# Patient Record
Sex: FEMALE
[De-identification: names, ages, dates, MRNs, and addresses within clinical notes are randomized per-mention and may not be internally consistent; named-entity substitution may affect disease eponyms.]

---

## 2017-08-11 ENCOUNTER — HOSPITAL ENCOUNTER (EMERGENCY)
Dept: HOSPITAL 14 - H.ER | Age: 32
Discharge: HOME | End: 2017-08-11
Payer: SELF-PAY

## 2017-08-11 VITALS
DIASTOLIC BLOOD PRESSURE: 83 MMHG | RESPIRATION RATE: 18 BRPM | OXYGEN SATURATION: 100 % | SYSTOLIC BLOOD PRESSURE: 131 MMHG | TEMPERATURE: 99 F | HEART RATE: 89 BPM

## 2017-08-11 DIAGNOSIS — Z3A.01: ICD-10-CM

## 2017-08-11 DIAGNOSIS — O20.0: Primary | ICD-10-CM

## 2017-08-11 LAB
BASOPHILS # BLD AUTO: 0.1 K/UL (ref 0–0.2)
BASOPHILS NFR BLD: 0.8 % (ref 0–2)
BUN SERPL-MCNC: 9 MG/DL (ref 7–17)
CALCIUM SERPL-MCNC: 9.5 MG/DL (ref 8.4–10.2)
EOSINOPHIL # BLD AUTO: 0.1 K/UL (ref 0–0.7)
EOSINOPHIL NFR BLD: 0.7 % (ref 0–4)
ERYTHROCYTE [DISTWIDTH] IN BLOOD BY AUTOMATED COUNT: 13.6 % (ref 11.5–14.5)
GFR NON-AFRICAN AMERICAN: > 60
HGB BLD-MCNC: 11.5 G/DL (ref 12–16)
LYMPHOCYTES # BLD AUTO: 3 K/UL (ref 1–4.3)
LYMPHOCYTES NFR BLD AUTO: 34.8 % (ref 20–40)
MCH RBC QN AUTO: 30.9 PG (ref 27–31)
MCHC RBC AUTO-ENTMCNC: 33.1 G/DL (ref 33–37)
MCV RBC AUTO: 93.2 FL (ref 81–99)
MONOCYTES # BLD: 1 K/UL (ref 0–0.8)
MONOCYTES NFR BLD: 11.3 % (ref 0–10)
NEUTROPHILS # BLD: 4.5 K/UL (ref 1.8–7)
NEUTROPHILS NFR BLD AUTO: 52.4 % (ref 50–75)
NRBC BLD AUTO-RTO: 0 % (ref 0–0)
PLATELET # BLD: 231 K/UL (ref 130–400)
PMV BLD AUTO: 9.6 FL (ref 7.2–11.7)
RBC # BLD AUTO: 3.72 MIL/UL (ref 3.8–5.2)
WBC # BLD AUTO: 8.6 K/UL (ref 4.8–10.8)

## 2017-08-11 NOTE — US
PROCEDURE:  First trimester fetal ultrasound



HISTORY:

pain of pregnancy



COMPARISON:

None



TECHNIQUE:

Transvaginal only. Real -time technique with 2D, duplex and color 

Doppler



FINDINGS:

Uterus measures 3.7 x 4.6 x 8.6 cm.



Gestational sac mean 1.24 cm corresponds to gestational age of 5 

weeks 3 days.  Yolk sac identified. No fetal pole identified.



Gestational age based On LMP 06/30/2017 6 weeks 0 days. 



Closed cervix 3.7 cm.



Right ovary 1.6 x 2.2 cm. Doppler arterial waveform documented.



Left ovary: 2 x 2 x 2.5 cm.  Unremarkable Doppler arterial waveform 

documented.







IMPRESSION:

Early intrauterine gestation.



PANCHITO based on LMP: 04/06/2018



PANCHITO based on biometry: 04/11/2018.

## 2017-08-11 NOTE — ED PDOC
HPI: Female  Pain


Chief Complaint (Provider): Vaginal bleeding


History Per: Patient


History/Exam Limitations: no limitations


Onset/Duration Of Symptoms: Hrs


Current Symptoms Are (Timing): Still Present


Pain Scale Rating Of: 3


Quality Of Discomfort: Cramping (suprapubic)


Associated Symptoms: denies: Fever, Nausea, Vomiting, Diarrhea, Urinary Symptoms


Additional Complaint(s): 





30 y/o F with no PMHx presents to ED c/o spotting vaginal bleeding since this 

morning. Patient is 6 weeks pregnant. G1. LMP 17. She was in the hosp 3 

days ago and betaHCG was >88424. PMD Dr Ribera. Denies trauma, vomiting, nausea

, diarrhea, headaches, dizziness, palpitations, SOB or CP. C/o mild pelvic 

cramps for the past 2 weeks. 


Abnormal Vaginal Bleeding: Yes


Last Menstral Period: 17


: 1


Para: 0





<Jethro Burgos - Last Filed: 17 16:39>





<Torito Mays - Last Filed: 17 18:40>


Chief Complaint (Nursing): Female Genitourinary





Supervising Attending Note





- Supervising Attending Note


The Documented history was done by the: Physician Extender


The documented physical exam was done by the: Physician Extender


The documented procedures were done by the: Physician Extender





- Attestation:


I have personally seen and examined this patient.: Yes


I have fully participated in the care of the patient.: Yes


I have reviewed all pertinent clinical information, including history, physical 

exam and plan: Yes





- Notes:


Notes:: 





Vaginal bleeding


Preg.





<Torito Mays - Last Filed: 17 18:40>





Past Medical History


Reviewed: Nursing Documentation, Vital Signs


Vital Signs: 





 Last Vital Signs











Temp  99 F   17 14:23


 


Pulse  89   17 14:23


 


Resp  18   17 14:23


 


BP  131/83   17 14:23


 


Pulse Ox  100   17 14:23














- Medical History


PMH: No Chronic Diseases





- Surgical History


Other surgeries: Breast augmentation





- Family History


Family History: States: No Known Family Hx





- Social History


Current smoker - smoking cessation education provided: No


Alcohol: None





<Jethro Burgos - Last Filed: 17 16:39>


Vital Signs: 





 Last Vital Signs











Temp  99 F   17 14:23


 


Pulse  89   17 14:23


 


Resp  18   17 14:23


 


BP  131/83   17 14:23


 


Pulse Ox  100   17 16:41














<Torito Mays - Last Filed: 17 18:40>





- Allergies


Allergies/Adverse Reactions: 


 Allergies











Allergy/AdvReac Type Severity Reaction Status Date / Time


 


No Known Allergies Allergy   Verified 17 14:23














Review of Systems


ROS Statement: Except As Marked, All Systems Reviewed And Found Negative


Genitourinary Female: Positive for: Vaginal Bleeding (spotting), Pelvic Pain (

cramping)





<Jethro Burgos - Last Filed: 17 16:39>





Physical Exam





- Reviewed


Vital Signs Reviewed: Yes





- Physical Exam


Appears: Positive for: Non-toxic, No Acute Distress


Head Exam: Positive for: ATRAUMATIC, NORMAL INSPECTION


Skin: Positive for: Normal Color, Warm


Cardiovascular/Chest: Positive for: Regular Rate, Rhythm.  Negative for: Gallop

, Murmur


Respiratory: Positive for: Normal Breath Sounds.  Negative for: Crackles, 

Wheezing


Gastrointestinal/Abdominal: Positive for: Soft.  Negative for: Tenderness, 

Distended, Guarding, Rebound


Back: Negative for: L CVA Tenderness, R CVA Tenderness


Extremity: Positive for: Normal ROM.  Negative for: Tenderness, Pedal Edema


Neurologic/Psych: Positive for: Alert, Oriented.  Negative for: Motor/Sensory 

Deficits





<Jethro Burgos - Last Filed: 17 16:39>





- Physical Exam


Cardiovascular/Chest: Positive for: Regular Rate, Rhythm


Respiratory: Positive for: Normal Breath Sounds


Gastrointestinal/Abdominal: Positive for: Soft.  Negative for: Tenderness





<Torito Mays - Last Filed: 17 18:40>





- ECG


O2 Sat by Pulse Oximetry: 100





<Jethro Burgos - Last Filed: 17 16:39>





- Laboratory Results


Result Diagrams: 


 17 16:55





 17 16:55


Interpretation Of Abn Labs: 24,225 bhcg





- Progress


ED Course And Treament: 





1839:  Stable.  AAOx3.  Pain free.  Tolerated PO.  Fu with pcp.





<Torito Mays - Last Filed: 17 18:40>





Medical Decision Making


Medical Decision Makin y/o F with no PMHx, G1, present c/o spotting vaginal bleeding





1st trimester vaginal bleeding


Rule out threatened  and ectopic pregnancy


TV and Obstetric US


BetaHCG


CMP, CBC, T&S





<Jethro Burgos - Last Filed: 17 16:39>





Disposition





<Jethro Burgos - Last Filed: 17 16:39>





- Patient ED Disposition


Is Patient to be Admitted: No


Counseled Patient/Family Regarding: Studies Performed, Diagnosis, Need For 

Followup





- Disposition


Disposition: Routine/Home


Disposition Time: 18:39





<Torito Mays - Last Filed: 17 18:40>





- Clinical Impression


Clinical Impression: 


 Threatened miscarriage








- Disposition


Condition: STABLE


Additional Instructions: 


Return if not in 3 days.  See the obgyn in 3 days.


Instructions:  Threatened Miscarriage (ED)


Forms:  [facility] (English)

## 2018-03-16 ENCOUNTER — HOSPITAL ENCOUNTER (EMERGENCY)
Dept: HOSPITAL 14 - H.EROB2 | Age: 33
Discharge: HOME | End: 2018-03-16
Payer: COMMERCIAL

## 2018-03-16 VITALS — BODY MASS INDEX: 28.4 KG/M2

## 2018-03-16 DIAGNOSIS — Z3A.36: ICD-10-CM

## 2018-03-16 DIAGNOSIS — O36.8130: Primary | ICD-10-CM

## 2018-03-16 NOTE — OBHP
===========================

Datetime: 2018 08:06

===========================

   

IP Adm Impression:  , intrauterine pregnancy

IP Admit Plan:  Observation/Evaluation; Discharge home

Admit Comment, IP Provider:  33 yo  at 36 wks w/ EDC 2018 by 6+ wk u/s reports that she 
has not been feeling the baby move throughout the night and this morning.  Pt reports that she usuall
y feels the babymoving a lot in the am.  Pt reports that she feels [place] this am.  Pt denies 
VB, LOF.  

   PMH: Healthy

   PSH: Breast augmentation 

   Meds: PNVs, iron supplementation 

   Fam hx: N/c

   Soc hx: Pt denies tobacco, alcohol, illicit drug use 

   POB hx: TAB x1 

                SAB x 1 

   Gyn hx: 11 x regular periods, denies STDs, positive HPV 2017 pap

   PE: AFVSS 

   Gen'l: pt appears comfortable in bed

   Heart: RRR

   Chest: Lungs CTA b/l

   Abd: soft, NT, gravid

   Ext: NT, no edema

   EFM: as above 

   [place]: as above 

   A/P: 33 yo  at 36 wks w/ decreased fetal movement throughout the night and this am

   NST reactive, BPP 8/8

   Pt reports that she feels the baby moving now

   Pt reports that she is now seeing Dr. Khan 2x/ week to follow fetal growth 

   Pt has an appointment w/ me on Tues., 2018  

Extremities - PN:  Normal

Abdomen - PN:  Normal

Back - PN:  Normal

Lungs - PN:  Normal

Heart - PN:  Normal

General - PN:  Normal

FHR - Baseline A Provider:  130's

Contraction Comments Provider:  irregular 

EGA AdmitDate IP:  36.0

Vital Signs Provider:  Reviewed; Within Normal Limits

IP Chief Complaint:  Decreased fetal movement

NICHD Variability Prov Fetus A:  Moderate 6-25bpm

NICHD Accel Fetus A IP Provider:  [id number]

NICHD Decel Fetus A IP Provider:  Variable

## 2018-03-16 NOTE — US
PROCEDURE:  OB Pelvic Ultrasound



HISTORY:

decreased fetal movement



LMP: Unknown



COMPARISON:

None available.



FINDINGS:



UTERUS:

Gestational sac: A single viable intrauterine gestation identified 

with cardiac activity measuring 144 beats per minute. Biophysical 

profile score is 8. 



Fetal breathing movements score 2. 



Fetal movements score 2. 



Fetal tones score 2. 



Amniotic fluid score 2. 







Fetal biometry not performed as requested with biophysical profile 

performed as above. 



OTHER FINDINGS:

None. 



IMPRESSION:

Biophysical profile score total is 8. Please see details above.

## 2018-03-16 NOTE — OBDCSUM
===========================

Datetime: 03/16/2018 10:10

===========================

   

Discharged to, Provider:  Home

Follow up at, Provider:  Dr Ribera

Disch Instr Activity:  Normal activity

Disch Instr Diet:  Regular

Discharge Instructions, Provider:  Routine instructions given

Discharge Time:  03/16/2018 10:10

Follow up in weeks, Provider:  Next Scheduled Appointment

Disch Referrals:  None

Discharge Diagnosis Prov Other:  Decreased fetal movement at 36 weeks

## 2018-03-17 VITALS — SYSTOLIC BLOOD PRESSURE: 134 MMHG | DIASTOLIC BLOOD PRESSURE: 76 MMHG | HEART RATE: 86 BPM

## 2018-03-20 ENCOUNTER — HOSPITAL ENCOUNTER (EMERGENCY)
Dept: HOSPITAL 14 - H.EROB2 | Age: 33
Discharge: HOME | End: 2018-03-20
Payer: COMMERCIAL

## 2018-03-20 VITALS
RESPIRATION RATE: 18 BRPM | SYSTOLIC BLOOD PRESSURE: 120 MMHG | OXYGEN SATURATION: 100 % | TEMPERATURE: 98.1 F | DIASTOLIC BLOOD PRESSURE: 82 MMHG | HEART RATE: 89 BPM

## 2018-03-20 VITALS — BODY MASS INDEX: 28.4 KG/M2

## 2018-03-20 DIAGNOSIS — Z3A.36: ICD-10-CM

## 2018-03-20 DIAGNOSIS — O36.5930: ICD-10-CM

## 2018-03-20 NOTE — OBHP
===========================

Datetime: 2018 17:18

===========================

   

IP Adm Impression:  , intrauterine pregnancy; No Active Labor; Intact Membranes

IP Admit Plan:  Discharge home

Admit Comment, IP Provider:   IUP at 36w sent from PMD to check FH.  Doppler at the office ?80
-110's.  She feels baby moving  She odette no CTX; no VB; 

      

   PNC: CP  Dr Ribera - followed for SGA with biweekly NST

      

   PMH: denies

   PSH: denies

   NKA

   PSoH Denies smoking ETOH drugs

   POBGYNH: TOP x 1; spont ab x 1

      

   She declined  SVE

    

   A: IUP at 36w

   reactive NST

   PLAN: discahrge home and follow up as scheduled next wk.

      

      

      

Fetal Presentation-Admit:  Vertex

FHR - Baseline A Provider:  135

IP Prenatal Hx Assessment:  The Prenatal History has been Reviewed and is Current

EGA AdmitDate IP:  36.4

IP Chief Complaint:  Other

NICHD Variability Prov Fetus A:  Moderate 6-25bpm

NICHD Accel Fetus A IP Provider:  [id number]

FHR Category Provider Fetus A:  Category I

NICHD Decel Fetus A IP Provider:  None

## 2018-03-20 NOTE — OBDCSUM
===========================

Datetime: 03/20/2018 18:01

===========================

   

Discharged to, Provider:  Home

Follow up at, Provider:  Carrie Miller Instr Activity:  Normal activity

Disch Instr Diet:  Regular

Discharge Instructions, Provider:  Routine instructions given

Discharge Time:  03/20/2018 18:10

Follow up in weeks, Provider:  to call for appointment in am

Disch Referrals:  None

Contraception discussed, Prov:  Yes

Discharge Diagnosis Prov Other:  reactive NST

## 2018-03-23 ENCOUNTER — HOSPITAL ENCOUNTER (EMERGENCY)
Dept: HOSPITAL 14 - H.EROB2 | Age: 33
Discharge: HOME | End: 2018-03-23
Payer: COMMERCIAL

## 2018-03-23 ENCOUNTER — HOSPITAL ENCOUNTER (INPATIENT)
Dept: HOSPITAL 14 - H.EROB2 | Age: 33
LOS: 2 days | Discharge: HOME | End: 2018-03-25
Attending: OBSTETRICS & GYNECOLOGY | Admitting: OBSTETRICS & GYNECOLOGY
Payer: COMMERCIAL

## 2018-03-23 VITALS — BODY MASS INDEX: 29.7 KG/M2

## 2018-03-23 VITALS — BODY MASS INDEX: 28.4 KG/M2

## 2018-03-23 DIAGNOSIS — A56.02: ICD-10-CM

## 2018-03-23 DIAGNOSIS — R10.2: ICD-10-CM

## 2018-03-23 DIAGNOSIS — Z3A.37: ICD-10-CM

## 2018-03-23 DIAGNOSIS — O26.93: Primary | ICD-10-CM

## 2018-03-23 LAB
BASOPHILS # BLD AUTO: 0.1 K/UL (ref 0–0.2)
BASOPHILS NFR BLD: 0.6 % (ref 0–2)
EOSINOPHIL # BLD AUTO: 0.1 K/UL (ref 0–0.7)
EOSINOPHIL NFR BLD: 0.8 % (ref 0–4)
ERYTHROCYTE [DISTWIDTH] IN BLOOD BY AUTOMATED COUNT: 14 % (ref 11.5–14.5)
HGB BLD-MCNC: 12.3 G/DL (ref 12–16)
LYMPHOCYTES # BLD AUTO: 2.7 K/UL (ref 1–4.3)
LYMPHOCYTES NFR BLD AUTO: 18.4 % (ref 20–40)
MCH RBC QN AUTO: 32 PG (ref 27–31)
MCHC RBC AUTO-ENTMCNC: 33.2 G/DL (ref 33–37)
MCV RBC AUTO: 96.3 FL (ref 81–99)
MONOCYTES # BLD: 1 K/UL (ref 0–0.8)
MONOCYTES NFR BLD: 7 % (ref 0–10)
NEUTROPHILS # BLD: 10.7 K/UL (ref 1.8–7)
NEUTROPHILS NFR BLD AUTO: 73.2 % (ref 50–75)
NRBC BLD AUTO-RTO: 0 % (ref 0–0)
PLATELET # BLD: 270 K/UL (ref 130–400)
PMV BLD AUTO: 11 FL (ref 7.2–11.7)
RBC # BLD AUTO: 3.84 MIL/UL (ref 3.8–5.2)
WBC # BLD AUTO: 14.6 K/UL (ref 4.8–10.8)

## 2018-03-23 PROCEDURE — 4A1HXCZ MONITORING OF PRODUCTS OF CONCEPTION, CARDIAC RATE, EXTERNAL APPROACH: ICD-10-PCS

## 2018-03-23 NOTE — OBADHP
===========================

Datetime: 2018 14:50

===========================

   

IP Adm Impression Other:  GBS+

Admit Comment, IP Provider:   IUP at 37w with CTX pain which has continued since leaving at 12
pm.  MOre painfuland more frequent.  Scant VB noted with mucous.  +FM.  Still felt some leaking.

      

      

   PNC: CP  Dr Ribera - followed for SGA with biweekly NST

      

   PMH: denies

   PSH: denies

   NKA

   PSoH Denies smoking ETOH drugs

   POBGYNH: TOP x 1; spont ab x 1

      

    

   A: IUP at 37w

   in  labor

   GBS+

   PLAN: IV Ab

   Admit to L_D, IV access, labs, pain management, labor, delivey and postpartum care

      

      

      

Abdomen - PN:  Normal

Back - PN:  Normal

Lungs - PN:  Normal

Heart - PN:  Normal

Thyroid - PN:  Normal

Neurologic - PN:  Normal

HEENT - PN:  Normal

General - PN:  Normal

Fetal Presentation-Admit:  Vertex

FHR - Baseline A Provider:  135

Membranes, Provider:  Intact

Pool Provider:  Negative

IP Prenatal Hx Assessment:  The Prenatal History has been Reviewed and is Current

Vital Signs Provider:  Reviewed; Within Normal Limits

IP Chief Complaint:  Uterine contractions

NICHD Variability Prov Fetus A:  Moderate 6-25bpm

NICHD Accel Fetus A IP Provider:  [id number]

FHR Category Provider Fetus A:  Category I

NICHD Decel Fetus A IP Provider:  None

Dilatation, Provider:  4

Effacement, Provider:  80

Station, Provider:  -1

EGA AdmitDate IP:  37.0

IP Adm Impression:  Term, intrauterine pregnancy; Active labor; Intact Membranes

IP Admit Plan:  Admit to unit; Initiate labor protocol

   

===========================

Datetime: 2018 11:40

===========================

   

Pelvic Type - PN:  Adequate

Contraction Comments Provider:  occ

[place] Provider:  Negative

Genitourinary Exam:  Normal

   

===========================

Datetime: 2018 08:06

===========================

   

Extremities - PN:  Normal

## 2018-03-23 NOTE — OBHP
===========================

Datetime: 2018 11:40

===========================

   

IP Adm Impression:  Term, intrauterine pregnancy; No Active Labor; Intact Membranes

IP Admit Plan:  Discharge home

Admit Comment, IP Provider:   IUP at 37w with CTX pain since yesterday at work.  She noticed s
ome wetness in vagina today.  CTX this morning was q15m.  +FM; no VB.  Since GBS was done, she feels 
vaginal burning.  No malodorous discharge.  + wet feeling.

      

   PNC: CP  Dr Ribera - followed for SGA with biweekly NST

      

   PMH: denies

   PSH: denies

   NKA

   PSoH Denies smoking ETOH drugs

   POBGYNH: TOP x 1; spont ab x 1

   Vagina: + thick white curdy discharge/disomfort with SSE and SVE

   SE closed

    

   A: IUP at 37w

   Not in labor

   Candidal vaginitis

   PLAN: discharge home 

   Terazol _; Tyelenol 650mg po q 4h prn pain 

   Labor instructions; pre-eclampsia warning

   Call office and  follow up next wk.

      

      

      

Pelvic Type - PN:  Adequate

Abdomen - PN:  Normal

General - PN:  Normal

FHR - Baseline A Provider:  130

Membranes, Provider:  Intact

Contraction Comments Provider:  occ

Pool Provider:  Negative

Ferning Provider:  Negative

IP Prenatal Hx Assessment:  The Prenatal History has been Reviewed and is Current

EGA AdmitDate IP:  37.0

Vital Signs Provider:  Reviewed

IP Chief Complaint:  Uterine contractions; Suspected ruptured membranes

NICHD Variability Prov Fetus A:  Moderate 6-25bpm

NICHD Accel Fetus A IP Provider:  [id number]

FHR Category Provider Fetus A:  Category I

NICHD Decel Fetus A IP Provider:  None

Dilatation, Provider:  0

Effacement, Provider:  0

Genitourinary Exam:  Normal

## 2018-03-23 NOTE — OBDS
===================================

MATERNAL INFORMATION

===================================

   

Provider Comments:  Uncomplicated Spontaneous Vaginal Delivery of a viable male infant with BW of  an
d APGAr scores of 9 and 9 with a left occipito- transverse positioning with nuchal cord X1. Baby was 
delivered over an intact perineum.

   EBL - 200mls

   

===================================

LABOR SUMMARY

===================================

   

EDC:  2018 00:00

No. Babies in Womb:  0

   

===================================

LABOR INFORMATION

===================================

   

Onset of Labor:  2018 14:25

Group B Beta Strep:  Positive

## 2018-03-23 NOTE — OBDCSUM
===========================

Datetime: 03/23/2018 11:40

===========================

   

Follow up at, Provider:  Dr Ribera

Disch Instr Activity:  Normal activity

Disch Instr Diet:  Regular

Discharge Diagnosis, Provider:  False Labor - Undelivered

Discharge Time:  03/30/2018 11:40

Follow up in weeks, Provider:  as per schedule/next week

Disch Activity Restrictions:  No sexual activity; Nothing in vagina - [place], tampons, douche

Discharge Diagnosis Prov Other:  Not in labor

   Candidal vaginitis

## 2018-03-23 NOTE — OBHP
===========================

Datetime: 2018 14:50

===========================

   

IP Adm Impression:  Term, intrauterine pregnancy; Active labor; Intact Membranes

IP Adm Impression Other:  GBS+

IP Admit Plan:  Admit to unit; Initiate labor protocol

Admit Comment, IP Provider:   IUP at 37w with CTX pain which has continued since leaving at 12
pm.  MOre painfuland more frequent.  Scant VB noted with mucous.  +FM.  Still felt some leaking.

      

      

   PNC: CP  Dr Ribera - followed for SGA with biweekly NST

      

   PMH: denies

   PSH: denies

   NKA

   PSoH Denies smoking ETOH drugs

   POBGYNH: TOP x 1; spont ab x 1

      

    

   A: IUP at 37w

   in  labor

   GBS+

   PLAN: IV Ab

   Admit to L_D, IV access, labs, pain management, labor, delivey and postpartum care

      

      

      

Abdomen - PN:  Normal

Back - PN:  Normal

Lungs - PN:  Normal

Heart - PN:  Normal

Thyroid - PN:  Normal

Neurologic - PN:  Normal

HEENT - PN:  Normal

General - PN:  Normal

Fetal Presentation-Admit:  Vertex

FHR - Baseline A Provider:  135

Membranes, Provider:  Intact

Pool Provider:  Negative

IP Prenatal Hx Assessment:  The Prenatal History has been Reviewed and is Current

EGA AdmitDate IP:  37.0

Vital Signs Provider:  Reviewed; Within Normal Limits

IP Chief Complaint:  Uterine contractions

NICHD Variability Prov Fetus A:  Moderate 6-25bpm

NICHD Accel Fetus A IP Provider:  [id number]

FHR Category Provider Fetus A:  Category I

NICHD Decel Fetus A IP Provider:  None

Dilatation, Provider:  4

Effacement, Provider:  80

Station, Provider:  -1

## 2018-03-23 NOTE — OBPN
===========================

Datetime: 03/23/2018 18:10

===========================

   

IP Progress Impression:  Normal progression of labor; Reassuring fetal heart rate

IP Informed Consent Obtain:  Vaginal Delivery; Risks, Benefits and Alternatives Discussed

IP Progress Plan:  Continue present management

FHR - Baseline A Provider:  135

IP Fetus A Comments:  one variable decel noted

Fetal Presentation-Admit:  Vertex

IP Progress Note Comment:  Called by nurse for variable decel noted/bloody show

   Mucous with blood noted at perineum- no active VB

   FH re-assuring

      

   Active phase of labor/progressing well

   GBS+

      

   PLAN: observe labor progress  - continue Ab (second dose 19:00pm)

      

NICHD Accel Fetus A IP Provider:  [id number]

FHR Category Provider Fetus A:  Category I

NICHD Variability Prov Fetus A:  Moderate 6-25bpm

Dilatation, Provider:  8

Effacement, Provider:  90

Station, Provider:  -1

NICHD Decel Fetus A IP Provider:  Variable

   

===========================

Datetime: 03/23/2018 14:50

===========================

   

Pool Provider:  Negative

Membranes, Provider:  Intact

Vital Signs Provider:  Reviewed; Within Normal Limits

   

===========================

Datetime: 03/23/2018 11:40

===========================

   

[place] Provider:  Negative

Contraction Comments Provider:  occ

## 2018-03-24 LAB
BASOPHILS # BLD AUTO: 0.1 K/UL (ref 0–0.2)
BASOPHILS NFR BLD: 0.4 % (ref 0–2)
EOSINOPHIL # BLD AUTO: 0 K/UL (ref 0–0.7)
EOSINOPHIL NFR BLD: 0.2 % (ref 0–4)
ERYTHROCYTE [DISTWIDTH] IN BLOOD BY AUTOMATED COUNT: 13.4 % (ref 11.5–14.5)
HGB BLD-MCNC: 9.9 G/DL (ref 12–16)
LYMPHOCYTES # BLD AUTO: 2.3 K/UL (ref 1–4.3)
LYMPHOCYTES NFR BLD AUTO: 12.7 % (ref 20–40)
MCH RBC QN AUTO: 32.5 PG (ref 27–31)
MCHC RBC AUTO-ENTMCNC: 34 G/DL (ref 33–37)
MCV RBC AUTO: 95.6 FL (ref 81–99)
MONOCYTES # BLD: 1.3 K/UL (ref 0–0.8)
MONOCYTES NFR BLD: 7.2 % (ref 0–10)
NEUTROPHILS # BLD: 14.7 K/UL (ref 1.8–7)
NEUTROPHILS NFR BLD AUTO: 79.5 % (ref 50–75)
NRBC BLD AUTO-RTO: 0 % (ref 0–0)
PLATELET # BLD: 187 K/UL (ref 130–400)
PMV BLD AUTO: 10.6 FL (ref 7.2–11.7)
RBC # BLD AUTO: 3.06 MIL/UL (ref 3.8–5.2)
WBC # BLD AUTO: 18.5 K/UL (ref 4.8–10.8)

## 2018-03-24 RX ADMIN — Medication SCH CAP: at 17:57

## 2018-03-24 RX ADMIN — Medication SCH CAP: at 10:43

## 2018-03-24 NOTE — OBPPN
===========================

Datetime: 2018 08:04

===========================

   

PP Pain Prov:  Within normal limits

PP Nausea Prov:  Denies

PP Flatus Prov:  Yes

PP Breasts Prov:  Normal

PP Heart Prov:  Normal

PP Lungs Prov:  Normal

PP Abdomen/Uterus Prov:  Normal

PP Lochia Prov:  Normal

PP Vulva/Perineum Prov:  Normal

PP CVA Tenderness Prov:  Normal

PP Extremities Prov:  Normal

PP Comments Phys Exam Prov:  Abd: Soft, NT, Bs- Present

   UT - Firm and NT

PP Impression Prov:  Normal postpartum progression

PP Plan Prov:  Continue present management

PP Progress Note Prov:  S/P Uncomplicated , PPD #1

   Clinically Stable.

   Plan: Continue postpartum care.

Vital Signs Provider PP:  Reviewed

## 2018-03-25 VITALS
HEART RATE: 82 BPM | RESPIRATION RATE: 20 BRPM | DIASTOLIC BLOOD PRESSURE: 77 MMHG | OXYGEN SATURATION: 100 % | SYSTOLIC BLOOD PRESSURE: 114 MMHG | TEMPERATURE: 97.9 F

## 2018-03-25 RX ADMIN — Medication SCH CAP: at 08:31

## 2018-03-25 NOTE — OBDCSUM
===========================

Datetime: 2018 09:01

===========================

   

Discharged to, Provider:  Home

Follow up at, Provider:  Dr Obinna Miller Instr Activity:  Normal activity

Disch Instr Diet:  Regular

Discharge Instructions, Provider:  Routine instructions given

Discharge Diagnosis, Provider:  Term Pregnancy Delivered

Discharge Time:  2018 09:01

Follow up in weeks, Provider:  6 weeks

Disch Referrals:  None

Contraception discussed, Prov:  Yes

Disch Activity Restrictions:  No exercising; Nothing in vagina - [place], tampons, douche

Discharge Comment, Provider:  S/P Uncomplicated , Clinically Stable

Discharge Diagnosis Prov Other:  S/P Uncomplicated , Clinically Stable

Contraception after Delivery:  Undecided